# Patient Record
Sex: MALE | Race: ASIAN | NOT HISPANIC OR LATINO | URBAN - METROPOLITAN AREA
[De-identification: names, ages, dates, MRNs, and addresses within clinical notes are randomized per-mention and may not be internally consistent; named-entity substitution may affect disease eponyms.]

---

## 2017-01-09 VITALS
TEMPERATURE: 98 F | HEART RATE: 68 BPM | OXYGEN SATURATION: 98 % | HEIGHT: 67 IN | DIASTOLIC BLOOD PRESSURE: 74 MMHG | RESPIRATION RATE: 16 BRPM | SYSTOLIC BLOOD PRESSURE: 134 MMHG | WEIGHT: 164.02 LBS

## 2017-01-10 ENCOUNTER — INPATIENT (INPATIENT)
Facility: HOSPITAL | Age: 67
LOS: 0 days | Discharge: HOME | DRG: 517 | End: 2017-01-11
Attending: DENTIST | Admitting: DENTIST
Payer: COMMERCIAL

## 2017-01-10 DIAGNOSIS — Z90.49 ACQUIRED ABSENCE OF OTHER SPECIFIED PARTS OF DIGESTIVE TRACT: Chronic | ICD-10-CM

## 2017-01-10 LAB
ANION GAP SERPL CALC-SCNC: 10 MMOL/L — SIGNIFICANT CHANGE UP (ref 9–16)
BUN SERPL-MCNC: 15 MG/DL — SIGNIFICANT CHANGE UP (ref 7–23)
CALCIUM SERPL-MCNC: 8.6 MG/DL — SIGNIFICANT CHANGE UP (ref 8.5–10.5)
CHLORIDE SERPL-SCNC: 102 MMOL/L — SIGNIFICANT CHANGE UP (ref 96–108)
CO2 SERPL-SCNC: 25 MMOL/L — SIGNIFICANT CHANGE UP (ref 22–31)
CREAT SERPL-MCNC: 0.83 MG/DL — SIGNIFICANT CHANGE UP (ref 0.5–1.3)
GLUCOSE SERPL-MCNC: 168 MG/DL — HIGH (ref 70–99)
HCT VFR BLD CALC: 41.1 % — SIGNIFICANT CHANGE UP (ref 39–50)
HGB BLD-MCNC: 14.5 G/DL — SIGNIFICANT CHANGE UP (ref 13–17)
MCHC RBC-ENTMCNC: 32.2 PG — SIGNIFICANT CHANGE UP (ref 27–34)
MCHC RBC-ENTMCNC: 35.3 G/DL — SIGNIFICANT CHANGE UP (ref 32–36)
MCV RBC AUTO: 91.3 FL — SIGNIFICANT CHANGE UP (ref 80–100)
PLATELET # BLD AUTO: 156 K/UL — SIGNIFICANT CHANGE UP (ref 150–400)
POTASSIUM SERPL-MCNC: 3.9 MMOL/L — SIGNIFICANT CHANGE UP (ref 3.5–5.3)
POTASSIUM SERPL-SCNC: 3.9 MMOL/L — SIGNIFICANT CHANGE UP (ref 3.5–5.3)
RBC # BLD: 4.5 M/UL — SIGNIFICANT CHANGE UP (ref 4.2–5.8)
RBC # FLD: 12.6 % — SIGNIFICANT CHANGE UP (ref 10.3–16.9)
SODIUM SERPL-SCNC: 137 MMOL/L — SIGNIFICANT CHANGE UP (ref 135–145)
WBC # BLD: 12.9 K/UL — HIGH (ref 3.8–10.5)
WBC # FLD AUTO: 12.9 K/UL — HIGH (ref 3.8–10.5)

## 2017-01-10 RX ORDER — AMPICILLIN SODIUM AND SULBACTAM SODIUM 250; 125 MG/ML; MG/ML
3 INJECTION, POWDER, FOR SUSPENSION INTRAMUSCULAR; INTRAVENOUS ONCE
Qty: 0 | Refills: 0 | Status: COMPLETED | OUTPATIENT
Start: 2017-01-10 | End: 2017-01-10

## 2017-01-10 RX ORDER — MORPHINE SULFATE 50 MG/1
4 CAPSULE, EXTENDED RELEASE ORAL EVERY 4 HOURS
Qty: 0 | Refills: 0 | Status: DISCONTINUED | OUTPATIENT
Start: 2017-01-10 | End: 2017-01-11

## 2017-01-10 RX ORDER — SODIUM CHLORIDE 9 MG/ML
1000 INJECTION, SOLUTION INTRAVENOUS
Qty: 0 | Refills: 0 | Status: DISCONTINUED | OUTPATIENT
Start: 2017-01-10 | End: 2017-01-11

## 2017-01-10 RX ORDER — AMPICILLIN SODIUM AND SULBACTAM SODIUM 250; 125 MG/ML; MG/ML
1.5 INJECTION, POWDER, FOR SUSPENSION INTRAMUSCULAR; INTRAVENOUS EVERY 6 HOURS
Qty: 0 | Refills: 0 | Status: DISCONTINUED | OUTPATIENT
Start: 2017-01-10 | End: 2017-01-11

## 2017-01-10 RX ORDER — LABETALOL HCL 100 MG
10 TABLET ORAL EVERY 4 HOURS
Qty: 0 | Refills: 0 | Status: DISCONTINUED | OUTPATIENT
Start: 2017-01-10 | End: 2017-01-11

## 2017-01-10 RX ORDER — ONDANSETRON 8 MG/1
4 TABLET, FILM COATED ORAL EVERY 4 HOURS
Qty: 0 | Refills: 0 | Status: DISCONTINUED | OUTPATIENT
Start: 2017-01-10 | End: 2017-01-11

## 2017-01-10 RX ORDER — TAMSULOSIN HYDROCHLORIDE 0.4 MG/1
0.4 CAPSULE ORAL AT BEDTIME
Qty: 0 | Refills: 0 | Status: DISCONTINUED | OUTPATIENT
Start: 2017-01-10 | End: 2017-01-11

## 2017-01-10 RX ORDER — HEPARIN SODIUM 5000 [USP'U]/ML
5000 INJECTION INTRAVENOUS; SUBCUTANEOUS EVERY 8 HOURS
Qty: 0 | Refills: 0 | Status: DISCONTINUED | OUTPATIENT
Start: 2017-01-10 | End: 2017-01-11

## 2017-01-10 RX ORDER — TAMSULOSIN HYDROCHLORIDE 0.4 MG/1
0.4 CAPSULE ORAL ONCE
Qty: 0 | Refills: 0 | Status: COMPLETED | OUTPATIENT
Start: 2017-01-10 | End: 2017-01-10

## 2017-01-10 RX ORDER — MORPHINE SULFATE 50 MG/1
2 CAPSULE, EXTENDED RELEASE ORAL
Qty: 0 | Refills: 0 | Status: DISCONTINUED | OUTPATIENT
Start: 2017-01-10 | End: 2017-01-10

## 2017-01-10 RX ADMIN — SODIUM CHLORIDE 100 MILLILITER(S): 9 INJECTION, SOLUTION INTRAVENOUS at 13:38

## 2017-01-10 RX ADMIN — AMPICILLIN SODIUM AND SULBACTAM SODIUM 100 GRAM(S): 250; 125 INJECTION, POWDER, FOR SUSPENSION INTRAMUSCULAR; INTRAVENOUS at 14:40

## 2017-01-10 RX ADMIN — AMPICILLIN SODIUM AND SULBACTAM SODIUM 200 GRAM(S): 250; 125 INJECTION, POWDER, FOR SUSPENSION INTRAMUSCULAR; INTRAVENOUS at 08:15

## 2017-01-10 RX ADMIN — MORPHINE SULFATE 2 MILLIGRAM(S): 50 CAPSULE, EXTENDED RELEASE ORAL at 14:48

## 2017-01-10 RX ADMIN — MORPHINE SULFATE 2 MILLIGRAM(S): 50 CAPSULE, EXTENDED RELEASE ORAL at 14:41

## 2017-01-10 RX ADMIN — MORPHINE SULFATE 2 MILLIGRAM(S): 50 CAPSULE, EXTENDED RELEASE ORAL at 15:30

## 2017-01-10 RX ADMIN — AMPICILLIN SODIUM AND SULBACTAM SODIUM 100 GRAM(S): 250; 125 INJECTION, POWDER, FOR SUSPENSION INTRAMUSCULAR; INTRAVENOUS at 21:33

## 2017-01-10 RX ADMIN — TAMSULOSIN HYDROCHLORIDE 0.4 MILLIGRAM(S): 0.4 CAPSULE ORAL at 17:07

## 2017-01-10 RX ADMIN — HEPARIN SODIUM 5000 UNIT(S): 5000 INJECTION INTRAVENOUS; SUBCUTANEOUS at 21:34

## 2017-01-10 RX ADMIN — MORPHINE SULFATE 2 MILLIGRAM(S): 50 CAPSULE, EXTENDED RELEASE ORAL at 14:25

## 2017-01-10 NOTE — PROGRESS NOTE ADULT - SUBJECTIVE AND OBJECTIVE BOX
ENT [facility] POST OP CHECK    Procedure    No acute events post op. Pain controlled. Tolerating PO. Ambulating. Denies nausea/vomiting.       MEDICATIONS:  Antiinfectives:   ampicillin/sulbactam  IVPB 1.5Gram(s) IV Intermittent every 6 hours    IV fluids:  lactated ringers. 1000milliLiter(s) IV Continuous <Continuous>    Hematologic/Anticoagulation:  heparin  Injectable 5000Unit(s) SubCutaneous every 8 hours    Pain medications/Neuro:  morphine  - Injectable 4milliGRAM(s) IV Push every 4 hours PRN  ondansetron Injectable 4milliGRAM(s) IV Push every 4 hours PRN    Endocrine Medications:     All other standing medications:   tamsulosin 0.4milliGRAM(s) Oral at bedtime    All other PRN medications:  labetalol Injectable 10milliGRAM(s) IV Push every 4 hours PRN      Vital Signs Last 24 Hrs  T(C): 37.2, Max: 37.4 (01-10 @ 12:31)  T(F): 99, Max: 99.4 (01-10 @ 12:31)  HR: 103 (84 - 103)  BP: 117/67 (106/64 - 140/74)  BP(mean): 92 (92 - 92)  RR: 16 (13 - 16)  SpO2: 94% (94% - 97%)      I & Os for current day (as of 01-10 @ 18:57)  =============================================  IN:    lactated ringers.: 300 ml    Total IN: 300 ml  ---------------------------------------------  OUT:    Bulb: 25 ml    Total OUT: 25 ml  ---------------------------------------------  Total NET: 275 ml        PHYSICAL EXAM:    ENT EXAM-   NAD  non-labored breathing, no stridor  incision appropriately healing, C/D/I  MMM  EOMI, PERRL  HB1    LABS:  CBC-      Coagulation Studies-    Endocrine Panel-  Calcium, Total Serum: 8.6 mg/dL (01-10 @ 14:11)              RADIOLOGY & ADDITIONAL STUDIES:      Assessment/Plan:  66y Male s/p buccal lesion excision with recon with buccal fat pad progressing well  - pain control  - anti-emetics prn  - regular diet  - ambulate  - salt water rinses after meals and at night  - discussed case with attending and agrees with the plan    PPX: SCDs, I/S    Dispo planning: SDU

## 2017-01-10 NOTE — H&P PST ADULT - ASSESSMENT
67 yo M with a hx of left maxillary/buccal SCC now s/p WLE, partial infrastructure maxillectomy, left buccal fat pad rotational flap, L supraomohyoid neck dissection. Pt admitted to SDU for post-op care and drain management.  -Admit to ENT  -pain control  -unasyn  -NPO/IVF  -Drain care  -OOBTC  -

## 2017-01-10 NOTE — PROVIDER CONTACT NOTE (OTHER) - ACTION/TREATMENT ORDERED:
Bladder scan done which revealed 687cc. Dr Gurrola made aware. Straight cath'd as per orders with ~700cc output. Will monitor.

## 2017-01-10 NOTE — PROVIDER CONTACT NOTE (OTHER) - SITUATION
Patient attempting to urinate, unable to do so. Patient OOB ambulated in hallway with wife and attempted to void again but was still unsuccessful.

## 2017-01-10 NOTE — H&P PST ADULT - HISTORY OF PRESENT ILLNESS
67 yo M with a hx of left maxillary/buccal SCC now s/p WLE, partial infrastructure maxillectomy, left buccal fat pad rotational flap, L supraomohyoid neck dissection. Pt admitted to SDU for post-op care and drain management.

## 2017-01-11 VITALS — TEMPERATURE: 99 F

## 2017-01-11 LAB
ANION GAP SERPL CALC-SCNC: 5 MMOL/L — LOW (ref 9–16)
BUN SERPL-MCNC: 14 MG/DL — SIGNIFICANT CHANGE UP (ref 7–23)
CALCIUM SERPL-MCNC: 8.2 MG/DL — LOW (ref 8.5–10.5)
CHLORIDE SERPL-SCNC: 101 MMOL/L — SIGNIFICANT CHANGE UP (ref 96–108)
CO2 SERPL-SCNC: 31 MMOL/L — SIGNIFICANT CHANGE UP (ref 22–31)
CREAT SERPL-MCNC: 0.82 MG/DL — SIGNIFICANT CHANGE UP (ref 0.5–1.3)
GLUCOSE SERPL-MCNC: 111 MG/DL — HIGH (ref 70–99)
HCT VFR BLD CALC: 38.8 % — LOW (ref 39–50)
HGB BLD-MCNC: 13.6 G/DL — SIGNIFICANT CHANGE UP (ref 13–17)
MAGNESIUM SERPL-MCNC: 2 MG/DL — SIGNIFICANT CHANGE UP (ref 1.6–2.4)
MCHC RBC-ENTMCNC: 32.4 PG — SIGNIFICANT CHANGE UP (ref 27–34)
MCHC RBC-ENTMCNC: 35.1 G/DL — SIGNIFICANT CHANGE UP (ref 32–36)
MCV RBC AUTO: 92.4 FL — SIGNIFICANT CHANGE UP (ref 80–100)
PHOSPHATE SERPL-MCNC: 2 MG/DL — LOW (ref 2.5–4.5)
PLATELET # BLD AUTO: 145 K/UL — LOW (ref 150–400)
POTASSIUM SERPL-MCNC: 4.2 MMOL/L — SIGNIFICANT CHANGE UP (ref 3.5–5.3)
POTASSIUM SERPL-SCNC: 4.2 MMOL/L — SIGNIFICANT CHANGE UP (ref 3.5–5.3)
RBC # BLD: 4.2 M/UL — SIGNIFICANT CHANGE UP (ref 4.2–5.8)
RBC # FLD: 12.8 % — SIGNIFICANT CHANGE UP (ref 10.3–16.9)
SODIUM SERPL-SCNC: 137 MMOL/L — SIGNIFICANT CHANGE UP (ref 135–145)
WBC # BLD: 10.5 K/UL — SIGNIFICANT CHANGE UP (ref 3.8–10.5)
WBC # FLD AUTO: 10.5 K/UL — SIGNIFICANT CHANGE UP (ref 3.8–10.5)

## 2017-01-11 PROCEDURE — 88341 IMHCHEM/IMCYTCHM EA ADD ANTB: CPT

## 2017-01-11 PROCEDURE — 88304 TISSUE EXAM BY PATHOLOGIST: CPT

## 2017-01-11 PROCEDURE — 86901 BLOOD TYPING SEROLOGIC RH(D): CPT

## 2017-01-11 PROCEDURE — 88333 PATH CONSLTJ SURG CYTO XM 1: CPT

## 2017-01-11 PROCEDURE — 88305 TISSUE EXAM BY PATHOLOGIST: CPT

## 2017-01-11 PROCEDURE — 85027 COMPLETE CBC AUTOMATED: CPT

## 2017-01-11 PROCEDURE — 80048 BASIC METABOLIC PNL TOTAL CA: CPT

## 2017-01-11 PROCEDURE — 36415 COLL VENOUS BLD VENIPUNCTURE: CPT

## 2017-01-11 PROCEDURE — 86900 BLOOD TYPING SEROLOGIC ABO: CPT

## 2017-01-11 PROCEDURE — 83735 ASSAY OF MAGNESIUM: CPT

## 2017-01-11 PROCEDURE — 84100 ASSAY OF PHOSPHORUS: CPT

## 2017-01-11 PROCEDURE — 86850 RBC ANTIBODY SCREEN: CPT

## 2017-01-11 PROCEDURE — 88311 DECALCIFY TISSUE: CPT

## 2017-01-11 PROCEDURE — 88331 PATH CONSLTJ SURG 1 BLK 1SPC: CPT

## 2017-01-11 PROCEDURE — 88309 TISSUE EXAM BY PATHOLOGIST: CPT

## 2017-01-11 RX ORDER — BACITRACIN ZINC 500 UNIT/G
1 OINTMENT IN PACKET (EA) TOPICAL
Qty: 0 | Refills: 0 | Status: DISCONTINUED | OUTPATIENT
Start: 2017-01-11 | End: 2017-01-11

## 2017-01-11 RX ORDER — BACITRACIN ZINC 500 UNIT/G
1 OINTMENT IN PACKET (EA) TOPICAL
Qty: 1 | Refills: 0 | OUTPATIENT
Start: 2017-01-11 | End: 2017-01-18

## 2017-01-11 RX ORDER — ACETAMINOPHEN WITH CODEINE 300MG-30MG
1 TABLET ORAL
Qty: 16 | Refills: 0 | OUTPATIENT
Start: 2017-01-11 | End: 2017-01-15

## 2017-01-11 RX ADMIN — AMPICILLIN SODIUM AND SULBACTAM SODIUM 100 GRAM(S): 250; 125 INJECTION, POWDER, FOR SUSPENSION INTRAMUSCULAR; INTRAVENOUS at 09:49

## 2017-01-11 RX ADMIN — AMPICILLIN SODIUM AND SULBACTAM SODIUM 100 GRAM(S): 250; 125 INJECTION, POWDER, FOR SUSPENSION INTRAMUSCULAR; INTRAVENOUS at 03:15

## 2017-01-11 RX ADMIN — HEPARIN SODIUM 5000 UNIT(S): 5000 INJECTION INTRAVENOUS; SUBCUTANEOUS at 06:18

## 2017-01-11 NOTE — PROGRESS NOTE ADULT - SUBJECTIVE AND OBJECTIVE BOX
ENT [facility] DAILY PROGRESS NOTE    Overnight events/Interval HPI: 66y Male s/p left WLE of buccal lesion + recon with buccal fat pad progressing well post op. STEPHANY output 25,50. Pain controlled. Tolerating diet. Ambulating. No complaints.          Allergies    No Known Allergies    Intolerances        MEDICATIONS:  Antiinfectives:   ampicillin/sulbactam  IVPB 1.5Gram(s) IV Intermittent every 6 hours    IV fluids:  lactated ringers. 1000milliLiter(s) IV Continuous <Continuous>    Hematologic/Anticoagulation:  heparin  Injectable 5000Unit(s) SubCutaneous every 8 hours    Pain medications/Neuro:  morphine  - Injectable 4milliGRAM(s) IV Push every 4 hours PRN  ondansetron Injectable 4milliGRAM(s) IV Push every 4 hours PRN    Endocrine Medications:     All other standing medications:   tamsulosin 0.4milliGRAM(s) Oral at bedtime  BACItracin   Ointment 1Application(s) Topical two times a day    All other PRN medications:  labetalol Injectable 10milliGRAM(s) IV Push every 4 hours PRN      Vital Signs Last 24 Hrs  T(C): 36.5, Max: 37.4 (01-10 @ 12:31)  T(F): 97.7, Max: 99.4 (01-10 @ 12:31)  HR: 86 (84 - 103)  BP: 122/68 (106/64 - 140/74)  BP(mean): 89 (88 - 92)  RR: 14 (13 - 18)  SpO2: 94% (93% - 97%)    I & Os for 24h ending 01-11 @ 07:00  =============================================  IN:    lactated ringers.: 1400 ml    IV PiggyBack: 100 ml    Total IN: 1500 ml  ---------------------------------------------  OUT:    Intermittent Catheterization - Urethral: 1800 ml    Voided: 650 ml    Bulb: 75 ml    Total OUT: 2525 ml  ---------------------------------------------  Total NET: -1025 ml    I & Os for current day (as of 01-11 @ 10:07)  =============================================  IN:    Total IN: 0 ml  ---------------------------------------------  OUT:    Voided: 300 ml    Bulb: 25 ml    Total OUT: 325 ml  ---------------------------------------------  Total NET: -325 ml        PHYSICAL EXAM:    ENT EXAM-   NAD  non-labored breathing, no stridor, voice strong  EOMI, PERRL  MMM, Incision C/D/I healing well  Neck soft, flat C/D/I with STEPHANY holding self suction draining S/S fluid.   CN II-XII grossly intact    LABS:  CBC-                        14.5   12.9  )-----------( 156      ( 10 Yaron 2017 14:11 )             41.1     BMP/CMP-  10 Yaron 2017 14:11    137    |  102    |  15     ----------------------------<  168    3.9     |  25     |  0.83     Ca    8.6        10 Yaron 2017 14:11      Coagulation Studies-    Endocrine Panel-  Calcium, Total Serum: 8.6 mg/dL (01-10 @ 14:11)              RADIOLOGY & ADDITIONAL STUDIES:      Assessment/Plan:  66y Male s/p left WLE of buccal lesion + recon with buccal fat pad progressing well post op  - monitor STEPHANY output  - if low will removed this afternoon   - D/C pending STEPHANY output  - prn pain control  - salt water rinses  - baci to incision  - flomax    - d/w attending MD whom agrees with the above plan        PPX: SCDs, DVT ppx with SUBQ hep.    Dispo planning:   -Home pending STEPHANY output

## 2017-01-11 NOTE — PROVIDER CONTACT NOTE (CHANGE IN STATUS NOTIFICATION) - ACTION/TREATMENT ORDERED:
Dr Gurrola made aware and at bedside to see patient. Dr Mejia to come evaluate as well. Will monitor.

## 2017-01-15 DIAGNOSIS — C06.0 MALIGNANT NEOPLASM OF CHEEK MUCOSA: ICD-10-CM

## 2017-01-15 DIAGNOSIS — N40.0 BENIGN PROSTATIC HYPERPLASIA WITHOUT LOWER URINARY TRACT SYMPTOMS: ICD-10-CM

## 2017-01-18 LAB — SURGICAL PATHOLOGY STUDY: SIGNIFICANT CHANGE UP

## 2017-01-19 DIAGNOSIS — C49.0 MALIGNANT NEOPLASM OF CONNECTIVE AND SOFT TISSUE OF HEAD, FACE AND NECK: ICD-10-CM

## 2017-01-19 DIAGNOSIS — C03.0 MALIGNANT NEOPLASM OF UPPER GUM: ICD-10-CM

## 2017-01-24 DIAGNOSIS — C49.0 MALIGNANT NEOPLASM OF CONNECTIVE AND SOFT TISSUE OF HEAD, FACE AND NECK: ICD-10-CM

## 2017-04-25 ENCOUNTER — OUTPATIENT (OUTPATIENT)
Dept: OUTPATIENT SERVICES | Facility: HOSPITAL | Age: 67
LOS: 1 days | End: 2017-04-25

## 2017-04-25 DIAGNOSIS — Z90.49 ACQUIRED ABSENCE OF OTHER SPECIFIED PARTS OF DIGESTIVE TRACT: Chronic | ICD-10-CM

## 2017-04-26 DIAGNOSIS — R22.1 LOCALIZED SWELLING, MASS AND LUMP, NECK: ICD-10-CM

## 2017-04-26 PROBLEM — R33.9 RETENTION OF URINE, UNSPECIFIED: Chronic | Status: ACTIVE | Noted: 2017-01-09

## 2017-05-10 ENCOUNTER — INPATIENT (INPATIENT)
Facility: HOSPITAL | Age: 67
LOS: 0 days | Discharge: HOME | DRG: 825 | End: 2017-05-11
Attending: DENTIST | Admitting: DENTIST
Payer: COMMERCIAL

## 2017-05-10 VITALS
DIASTOLIC BLOOD PRESSURE: 75 MMHG | TEMPERATURE: 98 F | RESPIRATION RATE: 16 BRPM | WEIGHT: 162.04 LBS | SYSTOLIC BLOOD PRESSURE: 144 MMHG | HEART RATE: 66 BPM | OXYGEN SATURATION: 96 % | HEIGHT: 67 IN

## 2017-05-10 DIAGNOSIS — Z98.890 OTHER SPECIFIED POSTPROCEDURAL STATES: Chronic | ICD-10-CM

## 2017-05-10 DIAGNOSIS — Z90.49 ACQUIRED ABSENCE OF OTHER SPECIFIED PARTS OF DIGESTIVE TRACT: Chronic | ICD-10-CM

## 2017-05-10 RX ORDER — DOXAZOSIN MESYLATE 4 MG
4 TABLET ORAL AT BEDTIME
Qty: 0 | Refills: 0 | Status: DISCONTINUED | OUTPATIENT
Start: 2017-05-10 | End: 2017-05-11

## 2017-05-10 RX ORDER — ALFUZOSIN HYDROCHLORIDE 10 MG/1
1 TABLET, EXTENDED RELEASE ORAL
Qty: 0 | Refills: 0 | COMMUNITY

## 2017-05-10 RX ORDER — ACETAMINOPHEN 500 MG
325 TABLET ORAL EVERY 4 HOURS
Qty: 0 | Refills: 0 | Status: DISCONTINUED | OUTPATIENT
Start: 2017-05-10 | End: 2017-05-11

## 2017-05-10 RX ORDER — SODIUM CHLORIDE 9 MG/ML
1000 INJECTION, SOLUTION INTRAVENOUS
Qty: 0 | Refills: 0 | Status: DISCONTINUED | OUTPATIENT
Start: 2017-05-10 | End: 2017-05-10

## 2017-05-10 RX ORDER — AMPICILLIN SODIUM AND SULBACTAM SODIUM 250; 125 MG/ML; MG/ML
3 INJECTION, POWDER, FOR SUSPENSION INTRAMUSCULAR; INTRAVENOUS ONCE
Qty: 0 | Refills: 0 | Status: DISCONTINUED | OUTPATIENT
Start: 2017-05-10 | End: 2017-05-10

## 2017-05-10 RX ORDER — CEFAZOLIN SODIUM 1 G
1000 VIAL (EA) INJECTION EVERY 8 HOURS
Qty: 0 | Refills: 0 | Status: COMPLETED | OUTPATIENT
Start: 2017-05-10 | End: 2017-05-11

## 2017-05-10 RX ORDER — ASPIRIN/CALCIUM CARB/MAGNESIUM 324 MG
1 TABLET ORAL
Qty: 0 | Refills: 0 | COMMUNITY

## 2017-05-10 RX ORDER — MORPHINE SULFATE 50 MG/1
4 CAPSULE, EXTENDED RELEASE ORAL EVERY 4 HOURS
Qty: 0 | Refills: 0 | Status: DISCONTINUED | OUTPATIENT
Start: 2017-05-10 | End: 2017-05-10

## 2017-05-10 RX ORDER — ONDANSETRON 8 MG/1
4 TABLET, FILM COATED ORAL EVERY 6 HOURS
Qty: 0 | Refills: 0 | Status: DISCONTINUED | OUTPATIENT
Start: 2017-05-10 | End: 2017-05-11

## 2017-05-10 RX ADMIN — MORPHINE SULFATE 4 MILLIGRAM(S): 50 CAPSULE, EXTENDED RELEASE ORAL at 10:30

## 2017-05-10 RX ADMIN — MORPHINE SULFATE 4 MILLIGRAM(S): 50 CAPSULE, EXTENDED RELEASE ORAL at 22:52

## 2017-05-10 RX ADMIN — SODIUM CHLORIDE 75 MILLILITER(S): 9 INJECTION, SOLUTION INTRAVENOUS at 10:25

## 2017-05-10 RX ADMIN — MORPHINE SULFATE 4 MILLIGRAM(S): 50 CAPSULE, EXTENDED RELEASE ORAL at 23:15

## 2017-05-10 RX ADMIN — Medication 4 MILLIGRAM(S): at 23:42

## 2017-05-10 RX ADMIN — MORPHINE SULFATE 4 MILLIGRAM(S): 50 CAPSULE, EXTENDED RELEASE ORAL at 10:49

## 2017-05-10 RX ADMIN — Medication 100 MILLIGRAM(S): at 22:52

## 2017-05-10 NOTE — PACU DISCHARGE NOTE - COMMENTS
pt sleeping.  easily awakened.  anterior neck steristrips c/d/i; STEPHANY x1 to ss.  reports decreased pain to neck.  denies n/v.  report given to MARCOS Mora on 9 uris.  pt to go to [id number] via stretcher with RN assistant transport

## 2017-05-10 NOTE — PROGRESS NOTE ADULT - SUBJECTIVE AND OBJECTIVE BOX
ENT Post op Check    Procedure: revision left neck dissection  Interim: no events. tolerating PO. pain well controlled. no nausea or vomiting. OOB and ambulating.     PE:  AFVSS  no increased WOB. no stridor  neck soft, flat. no erythema or ecchymosis. STEPHANY drain in place with serosanguinous   face symmetric. shoulder strength intact    A/P: 67M s/p revision left neck dissection  - ancef  - pain control prn  - anti emetisc prn  - c/w home meds  - OOB, ambulate  - regular diet  - STEPHANY drain management

## 2017-05-11 VITALS
HEART RATE: 68 BPM | OXYGEN SATURATION: 98 % | DIASTOLIC BLOOD PRESSURE: 65 MMHG | TEMPERATURE: 98 F | SYSTOLIC BLOOD PRESSURE: 104 MMHG | RESPIRATION RATE: 17 BRPM

## 2017-05-11 PROCEDURE — 88307 TISSUE EXAM BY PATHOLOGIST: CPT

## 2017-05-11 PROCEDURE — 88304 TISSUE EXAM BY PATHOLOGIST: CPT

## 2017-05-11 PROCEDURE — 86900 BLOOD TYPING SEROLOGIC ABO: CPT

## 2017-05-11 PROCEDURE — 86901 BLOOD TYPING SEROLOGIC RH(D): CPT

## 2017-05-11 PROCEDURE — 86850 RBC ANTIBODY SCREEN: CPT

## 2017-05-11 PROCEDURE — 88331 PATH CONSLTJ SURG 1 BLK 1SPC: CPT

## 2017-05-11 RX ADMIN — Medication 100 MILLIGRAM(S): at 15:14

## 2017-05-11 RX ADMIN — Medication 100 MILLIGRAM(S): at 05:58

## 2017-05-11 NOTE — PROGRESS NOTE ADULT - SUBJECTIVE AND OBJECTIVE BOX
ENT [facility] DAILY PROGRESS NOTE    POD1: no issues. tolerating PO. OOB. ambulating. pain minimal. feels comfortable going home.           Allergies    No Known Allergies    Intolerances        MEDICATIONS:  Antiinfectives:   ceFAZolin   IVPB 1000milliGRAM(s) IV Intermittent every 8 hours    IV fluids:    Hematologic/Anticoagulation:    Pain medications/Neuro:  acetaminophen   Tablet. 325milliGRAM(s) Oral every 4 hours PRN  oxyCODONE  5 mG/acetaminophen 325 mG 1Tablet(s) Oral every 4 hours PRN  ondansetron Injectable 4milliGRAM(s) IV Push every 6 hours PRN    Endocrine Medications:     All other standing medications:   doxazosin 4milliGRAM(s) Oral at bedtime    All other PRN medications:      Vital Signs Last 24 Hrs  T(C): 36.8, Max: 36.9 (05-10 @ 11:43)  T(F): 98.3, Max: 98.4 (05-10 @ 11:43)  HR: 62 (58 - 78)  BP: 116/72 (103/64 - 123/72)  BP(mean): --  RR: 16 (16 - 17)  SpO2: 95% (94% - 98%)    I & Os for 24h ending 05-11 @ 07:00  =============================================  IN:    lactated ringers.: 1375 ml    Oral Fluid: 440 ml    Total IN: 1815 ml  ---------------------------------------------  OUT:    Voided: 1470 ml    Bulb: 95 ml    Total OUT: 1565 ml  ---------------------------------------------  Total NET: 250 ml    I & Os for current day (as of 05-11 @ 09:14)  =============================================  IN:    Total IN: 0 ml  ---------------------------------------------  OUT:    Voided: 500 ml    Total OUT: 500 ml  ---------------------------------------------  Total NET: -500 ml        PHYSICAL EXAM:    PE:  AFVSS  no increased WOB. no stridor  neck soft, flat. no erythema or ecchymosis. STEPHANY drain in place with serosanguinous   face symmetric. shoulder strength intact      A/P: 67M s/p revision left neck dissection  - ancef  - pain control prn  - anti emetisc prn  - c/w home meds  - OOB, ambulate  - regular diet  - STEPHANY drain management    - should be d/c later today if drain output decreases

## 2017-05-15 DIAGNOSIS — C76.0 MALIGNANT NEOPLASM OF HEAD, FACE AND NECK: ICD-10-CM

## 2017-05-15 DIAGNOSIS — C77.0 SECONDARY AND UNSPECIFIED MALIGNANT NEOPLASM OF LYMPH NODES OF HEAD, FACE AND NECK: ICD-10-CM

## 2017-05-15 DIAGNOSIS — N40.0 BENIGN PROSTATIC HYPERPLASIA WITHOUT LOWER URINARY TRACT SYMPTOMS: ICD-10-CM

## 2017-05-15 DIAGNOSIS — Z79.82 LONG TERM (CURRENT) USE OF ASPIRIN: ICD-10-CM

## 2017-05-16 DIAGNOSIS — Z85.89 PERSONAL HISTORY OF MALIGNANT NEOPLASM OF OTHER ORGANS AND SYSTEMS: ICD-10-CM

## 2017-05-18 LAB — SURGICAL PATHOLOGY STUDY: SIGNIFICANT CHANGE UP

## 2025-03-29 NOTE — PATIENT PROFILE ADULT. - TEACHING/LEARNING LEARNING PREFERENCES
Writer has unsuccessful IV attempt. Pt request US IV placement. Another resource contacted.    individual instruction/written material/audio/verbal instruction